# Patient Record
Sex: MALE | Race: WHITE | Employment: FULL TIME | URBAN - METROPOLITAN AREA
[De-identification: names, ages, dates, MRNs, and addresses within clinical notes are randomized per-mention and may not be internally consistent; named-entity substitution may affect disease eponyms.]

---

## 2020-11-16 ENCOUNTER — APPOINTMENT (OUTPATIENT)
Dept: GENERAL RADIOLOGY | Age: 30
End: 2020-11-16

## 2020-11-16 ENCOUNTER — HOSPITAL ENCOUNTER (EMERGENCY)
Age: 30
Discharge: HOME | End: 2020-11-16

## 2020-11-16 VITALS
OXYGEN SATURATION: 98 % | DIASTOLIC BLOOD PRESSURE: 100 MMHG | HEART RATE: 95 BPM | HEIGHT: 71 IN | RESPIRATION RATE: 18 BRPM | TEMPERATURE: 97.1 F | SYSTOLIC BLOOD PRESSURE: 131 MMHG | WEIGHT: 192.68 LBS | BODY MASS INDEX: 26.98 KG/M2

## 2020-11-16 PROCEDURE — 99282 EMERGENCY DEPT VISIT SF MDM: CPT

## 2020-11-16 PROCEDURE — 90471 IMMUNIZATION ADMIN: CPT | Performed by: PHYSICIAN ASSISTANT

## 2020-11-16 PROCEDURE — 73140 X-RAY EXAM OF FINGER(S): CPT

## 2020-11-16 PROCEDURE — 12002 RPR S/N/AX/GEN/TRNK2.6-7.5CM: CPT

## 2020-11-16 PROCEDURE — 6360000002 HC RX W HCPCS: Performed by: PHYSICIAN ASSISTANT

## 2020-11-16 PROCEDURE — 90715 TDAP VACCINE 7 YRS/> IM: CPT | Performed by: PHYSICIAN ASSISTANT

## 2020-11-16 RX ADMIN — CLOSTRIDIUM TETANI TOXOID ANTIGEN (FORMALDEHYDE INACTIVATED), CORYNEBACTERIUM DIPHTHERIAE TOXOID ANTIGEN (FORMALDEHYDE INACTIVATED), BORDETELLA PERTUSSIS TOXOID ANTIGEN (GLUTARALDEHYDE INACTIVATED), BORDETELLA PERTUSSIS FILAMENTOUS HEMAGGLUTININ ANTIGEN (FORMALDEHYDE INACTIVATED), BORDETELLA PERTUSSIS PERTACTIN ANTIGEN, AND BORDETELLA PERTUSSIS FIMBRIAE 2/3 ANTIGEN 0.5 ML: 5; 2; 2.5; 5; 3; 5 INJECTION, SUSPENSION INTRAMUSCULAR at 20:21

## 2020-11-16 ASSESSMENT — ENCOUNTER SYMPTOMS
APNEA: 0
CHOKING: 0
SHORTNESS OF BREATH: 0
ABDOMINAL PAIN: 0
NAUSEA: 0
VOMITING: 0
FACIAL SWELLING: 0
EYE DISCHARGE: 0
EYE REDNESS: 0
BACK PAIN: 0

## 2020-11-16 ASSESSMENT — PAIN DESCRIPTION - DESCRIPTORS
DESCRIPTORS: ACHING
DESCRIPTORS: ACHING

## 2020-11-16 ASSESSMENT — PAIN DESCRIPTION - LOCATION
LOCATION: FINGER (COMMENT WHICH ONE)
LOCATION: FINGER (COMMENT WHICH ONE)

## 2020-11-16 ASSESSMENT — PAIN DESCRIPTION - PAIN TYPE
TYPE: ACUTE PAIN
TYPE: ACUTE PAIN

## 2020-11-16 ASSESSMENT — PAIN SCALES - GENERAL: PAINLEVEL_OUTOF10: 3

## 2020-11-16 ASSESSMENT — PAIN DESCRIPTION - ORIENTATION
ORIENTATION: LEFT
ORIENTATION: LEFT

## 2020-11-17 NOTE — ED NOTES
Walked pt from [facility] to ED bed. Obtained VS. Pt wearing mask, medic wearing mask, gloves, safety glasses.      Darnell Plummer, EMT-P  11/16/20 9300

## 2020-11-17 NOTE — ED PROVIDER NOTES
**ADVANCED PRACTICE PROVIDER, I HAVE EVALUATED THIS PATIENT**        [facility] ENCOUNTER      Pt Name: Roxane Stovall  SAX:[id number]  [place] 1990  Date of evaluation: 11/16/2020  Provider: Denise Bell PA-C      Chief Complaint:    Chief Complaint   Patient presents with    Laceration     to L thumb at 1600 today with teresa knife/razor blade. pain 4/10       Nursing Notes, Past Medical Hx, Past Surgical Hx, Social Hx, Allergies, and Family Hx were all reviewed and agreed with or any disagreements were addressed in the HPI.    HPI:  (Location, Duration, Timing, Severity, Quality, Assoc Sx, Context, Modifying factors)  This is a  31 y.o. male complaint of laceration to left thumb. Patient was cutting tile and had a new knife and it slipped and cut his left thumb. He is left-hand dominant. He is unsure when he had his last tetanus. We will update his tetanus here today in emergency room. Denies numbness or tingling. He did that about 3:00 this afternoon and he wrapped it until you finish his project and when he unwrapped it was still bleeding so he came in. PastMedical/Surgical History:  History reviewed. No pertinent past medical history. Procedure Laterality Date    TYMPANOSTOMY TUBE PLACEMENT         Medications:  Previous Medications    TETRAHYDROZOLINE (VISINE) 0.05 % OPHTHALMIC SOLUTION    1 drop 3 times daily. Review of Systems:  Review of Systems   Constitutional: Negative for chills and fever. HENT: Negative for congestion, facial swelling and sneezing. Eyes: Negative for discharge and redness. Respiratory: Negative for apnea, choking and shortness of breath. Cardiovascular: Negative for chest pain. Gastrointestinal: Negative for abdominal pain, nausea and vomiting. Genitourinary: Negative for dysuria. Musculoskeletal: Negative for back pain, neck pain and neck stiffness.    Neurological: Negative for with the below findings:        Interpretation per the Radiologist below, if available at the time of this note:    XR FINGER LEFT (MIN 2 VIEWS)   Final Result   No acute osseous injury of the thumb is identified. No results found. MEDICAL DECISION MAKING / ED COURSE:      PROCEDURES:   Lac Repair    Date/Time: 11/16/2020 9:30 PM  Performed by: Denise Bell PA-C  Authorized by: Denise Bell PA-C     Consent:     Consent obtained:  Verbal    Consent given by:  Patient    Risks discussed:  Infection, pain, retained foreign body, poor cosmetic result, need for additional repair, nerve damage and poor wound healing  Anesthesia (see MAR for exact dosages):      Anesthesia method:  Nerve block    Block location:  Left thumb digital block    Block needle gauge:  27 G    Block anesthetic:  Lidocaine 1% w/o epi    Block technique:  Digital block    Block injection procedure:  Anatomic landmarks identified, introduced needle, incremental injection, anatomic landmarks palpated and negative aspiration for blood    Block outcome:  Anesthesia achieved  Laceration details:     Location:  Finger    Finger location:  L thumb    Length (cm):  3  Repair type:     Repair type:  Simple  Pre-procedure details:     Preparation:  Patient was prepped and draped in usual sterile fashion and imaging obtained to evaluate for foreign bodies  Exploration:     Wound exploration: wound explored through full range of motion      Wound extent: no fascia violation noted, no foreign bodies/material noted, no muscle damage noted, no tendon damage noted and no underlying fracture noted      Contaminated: no    Treatment:     Area cleansed with:  Hibiclens and saline    Amount of cleaning:  Standard    Irrigation solution:  Sterile saline    Irrigation volume:  200cc    Irrigation method:  Tap    Visualized foreign bodies/material removed: no    Skin repair:     Repair method:  Tissue adhesive  Approximation:     Approximation: Close  Post-procedure details:     Patient tolerance of procedure: Tolerated well, no immediate complications        None    Patient was given:  Medications   Tetanus-Diphth-Acell Pertussis (BOOSTRIX) injection 0.5 mL (0.5 mLs Intramuscular Given 11/16/20 2021)     Emergency room course: Patient on exam cardiovascular regular rhythm, lungs are clear. No wheeze rales or rhonchi noted. Examination left upper extremity shoulder show no tenderness elbow no tenderness wrist nontender with full range of motion. Has a laceration to the medial thumb at the proximal phalanx along the lateral side. Does not involve the nail plate or nailbed. Full range of motion MCP and DIP joint. Sensation is intact distally capillary refill less than 2 seconds. Radial pulse good at 2+. X-ray of the left hand shows no acute osseous abnormality identified. After given the patient a digital block with 1% lidocaine the wound was clean. Superficial laceration across the medial aspect of the distal phalanx. Bleeding was controlled. Wound edges came together very well so Dermabond was applied. Patient tolerated procedure very well no complication. Instructed not scrub the area. Keep the area clean and dry. Do not use any ointments or lotions on the skin in that area. The patient tolerated their visit well. I evaluated the patient. The physician was available for consultation as needed. The patient and / or the family were informed of the results of any tests, a time was given to answer questions, a plan was proposed and they agreed with plan. CLINICAL IMPRESSION:  1.  Laceration of left thumb without foreign body without damage to nail, initial encounter        DISPOSITION  DISPOSITION Decision To Discharge 11/16/2020 09:28:14 PM          PATIENT REFERRED TO:  Alvaro Lovell MD  [address] [place] [address]  [phone]    Call   As needed, If symptoms worsen      DISCHARGE MEDICATIONS:  New Prescriptions    No medications on file       DISCONTINUED MEDICATIONS:  Discontinued Medications    No medications on file              (Please note the MDM and HPI sections of this note were completed with a voice recognition program.  Efforts were made to edit the dictations but occasionally words are mis-transcribed.)    Electronically signed, Prasad Myrick PA-C,          Prasad Myrick PA-C  11/16/20 2064

## 2020-11-17 NOTE — ED NOTES
Ambulates to room #7 with the complaint of laceration-flap type to left thumb  That occurred approx 3-430p while using a 'hook knife' at home  States after the incident he wrapped tape around it  Bleeding controlled  Unsure of Dt status     Edmundo Burroughs RN  11/16/20 2014

## 2020-11-19 ENCOUNTER — TELEPHONE (OUTPATIENT)
Dept: ORTHOPEDIC SURGERY | Age: 30
End: 2020-11-19